# Patient Record
(demographics unavailable — no encounter records)

---

## 2024-10-31 NOTE — PHYSICAL EXAM
[Chaperone Present] : A chaperone was present in the examining room during all aspects of the physical examination [[id number]] : A chaperone was present during the pelvic exam. [FreeTextEntry2] : Inez Villanueva [Appropriately responsive] : appropriately responsive [Alert] : alert [No Acute Distress] : no acute distress [Soft] : soft [Non-tender] : non-tender [Oriented x3] : oriented x3 [FreeTextEntry4] : Clinically well perfused [FreeTextEntry5] : No increased work of breathing  [Labia Majora] : normal [Labia Minora] : normal [Normal] : normal [Uterine Adnexae] : normal

## 2024-10-31 NOTE — PLAN
[FreeTextEntry1] : Pt is a 36 yo G0 with PMH anemia and FH uterine cancer presenting today for consult for AUB-L.   #AUB - Records reviewed on pts phone. Pt to email TVUS and pap records to practice to be scanned to chart.  - CBC today - Given FH endometrial cancer in mother at a young age and current worsening AUB, EMB performed today - Reviewed TVUS with pt and discussed findings in detail. Discussed management options for fibroids including expectant vs medical vs surgical vs. IR. We discussed medical options include oral medications provera and TXA or IUD. We discussed pt is not a good candidate for cOCP given likely cHTN. Discussed hormonal IUD as a good long term option as this would control sx and reduce pts risk of endometrial cancer. Pt is currently trying to conceive and declines IUD at this time. We discussed surgical management with HSC myomectomy, which may be recommended regardless of AUB if pt chooses to pursue fertility treatment. We discussed that I do not recommend IR embolization of uterine artery as she desires future fertility. Pt is most interested in HSC myomectomy.   #Trying to Conceive - Discussed given >36 yo TTC >6 mo would recommend referral to [facility]. Referral provided.   #FH Breast cancer - Pts mother diagnosed with breast cancer in early 40s. Pt has never had mammogram. - Referral provided for mammogram  #Likely HTN - per pt she already has home BP monitor from PCP to confirm diagnosis  Pt likely interested in HSC myomectomy and also desires [facility] consult. We discussed she can pursue HSC myomectomy either with [facility] team or with me. Pt to follow up after [facility] consult regarding next steps. Pt in agreement with plan, all questions answered.

## 2024-10-31 NOTE — PHYSICAL EXAM
[Chaperone Present] : A chaperone was present in the examining room during all aspects of the physical examination [[id number]] : A chaperone was present during the pelvic exam. [FreeTextEntry2] : Inez Villanueva [Appropriately responsive] : appropriately responsive [Alert] : alert [No Acute Distress] : no acute distress [Soft] : soft [Non-tender] : non-tender [Oriented x3] : oriented x3 [FreeTextEntry4] : Clinically well perfused [FreeTextEntry5] : No increased work of breathing  [Labia Majora] : normal [Labia Minora] : normal [Normal] : normal [Uterine Adnexae] : normal

## 2024-10-31 NOTE — HISTORY OF PRESENT ILLNESS
[Patient reported PAP Smear was normal] : Patient reported PAP Smear was normal [Y] : Patient is sexually active [N] : Patient denies prior pregnancies [Currently Active] : currently active [Men] : men [No] : No [FreeTextEntry1] : Pt is a 38 yo G0 presenting for consultation to discuss fibroids. Reports she was seen recently for annual visit with a different provider and had TVUS due to heavy menses and palpated mass on exam. Pt has US report with her today on her phone, reviewed shows 4 1-3 cm fibroids including one with submucosal component. Per US report, benign appearance. She reports pap with other provider wnl. Pt reports she has regular cycles and has very heavy flow requiring pad every 2hrs. Denies intermenstrual spotting. Also reports pain with menses. Reports previously did not have heavy cycles and reports this started ~2 yrs ago. Pt reports her mother had uterine cancer at age 37 and pt is concerned about risk of malignancy.   Of note, pt has h/o chronic anemia. Reports she follows with PCP for this reason and that it has been attributed to bariatric surgery, possibly also from AUB. Reports has not seen heme/onc but has seen GI and had normal endoscopy/colonoscopy. Reports had episode 2 yrs ago of syncope that was attributed to anemia after full neuro work up (including head imaging). Denies current lightheadedness/dizziness.   Pt reports she and her partner have been trying to conceive for last 6 mo and have been having regular unprotected sex.   OBHx: SABx1, TABx1 (D&C) GYNHx: Fibroids, AUB. Denies h/o STI, cyst, abnormal pap. PMH: Anemia as described above, likely HTN (HTN today and per pt at PCP) PSH: Tonsillectomy, LSC gastric sleeve SocHx: Some alcohol use, denies tobacco and drug use FamHx: FH uterine cancer age 37 in mom, breast cancer in mom and maternal aunt in 30s-40s, mom diagnosed early 40s. Thinks mom was tested and BRCA neg. Denies FH colon cancer.  Meds: multivitamin, famotidine, vit D  Allergies: PCN (hives)  [PapSmeardate] : 2024 [LMPDate] : 10/11/24 [MensesFreq] : 28-31 [MensesLength] : 7

## 2024-10-31 NOTE — PROCEDURE
[Endometrial Biopsy] : Endometrial biopsy [Time out performed] : Pre-procedure time out performed.  Patient's name, date of birth and procedure confirmed. [Consent Obtained] : Consent obtained [Pre-op Evaluation] : Pre-op evaluation [Irregular Bleeding] : irregular uterine bleeding [Risks] : risks [Benefits] : benefits [Alternatives] : alternatives [Patient] : patient [Infection] : infection [Bleeding] : bleeding [Allergic Reaction] : allergic reaction [Uterine Perforation] : uterine perforation [Pain] : pain [Negative] : negative pregnancy test [No Premedication] : No premedication [None] : none [Tenaculum] : Tenaculum [Easy Passage] : Easy passage [Sounded to ___ cm] : sounded to [unfilled] ~Ucm [Anteverted] : anteverted [Moderate] : moderate [Specimen Collected] : collected [Sent to Pathology] : placed in buffered formalin and sent for pathology [Tolerated Well] : Patient tolerated the procedure well [No Complications] : No complications [de-identified] : AUB with family history uterine cancer in mother [de-identified] : good hemostasis acheived with application of Monsel's

## 2024-10-31 NOTE — PROCEDURE
[Endometrial Biopsy] : Endometrial biopsy [Time out performed] : Pre-procedure time out performed.  Patient's name, date of birth and procedure confirmed. [Consent Obtained] : Consent obtained [Pre-op Evaluation] : Pre-op evaluation [Irregular Bleeding] : irregular uterine bleeding [Risks] : risks [Benefits] : benefits [Alternatives] : alternatives [Patient] : patient [Infection] : infection [Bleeding] : bleeding [Allergic Reaction] : allergic reaction [Uterine Perforation] : uterine perforation [Pain] : pain [Negative] : negative pregnancy test [No Premedication] : No premedication [None] : none [Tenaculum] : Tenaculum [Easy Passage] : Easy passage [Sounded to ___ cm] : sounded to [unfilled] ~Ucm [Anteverted] : anteverted [Moderate] : moderate [Specimen Collected] : collected [Sent to Pathology] : placed in buffered formalin and sent for pathology [Tolerated Well] : Patient tolerated the procedure well [No Complications] : No complications [de-identified] : AUB with family history uterine cancer in mother [de-identified] : good hemostasis acheived with application of Monsel's

## 2024-11-22 NOTE — PLAN
[FreeTextEntry1] :  Pt is a 38 yo G0 with PMH anemia and likely cHTN and FH uterine cancer following for AUB, desiring surgical management. - Discussed again options for fibroid management including expectant vs medical vs surgical vs IR. Pt elects surgical management.  - Discussed in detail procedure and pre and postoperative expectations for HSC myomectomy. Discussed risks of bleeding, infection, damage to surrounding structures including uterine perforation and risk of need for laparoscopy/laparotomy/other procedure if this were to occur. Also discussed risk of possible need for staged procedure given size of fibroid if fluid deficit met.  - Pt sent US report but images not available. Pt to contact [facility] to obtain disk with images from TVUS.  - SIS in office 1/3, will do preop CBC at that time - Tentative surgery date 1/13 pending PCP clearance and imaging. Pt to see PCP within 30 days of this date for medical clearance.   Pt in agreement with plan, all questions answered. Discussed if continues to have postcoital spotting should RTC sooner.

## 2024-11-22 NOTE — HISTORY OF PRESENT ILLNESS
[FreeTextEntry1] : Telehealth visit:   Pt and I are both in [place]. Pt is a 38 yo G0 with PMH anemia and likely cHTN and FH uterine cancer following for AUB. Had TVUS with [facility] with report showing submucosal fibroid. Pt s/p benign endometrial biopsy and per opt had pap with [facility] that was normal. At last visit, we discussed fibroid management and pt elected HSC myomectomy. Pt presents today to discuss this option further. Is feeling well after EMB. Does endorse postcoital spotting a few days ago (>2 wks after biopsy) which has never happened to her before. Resolved without intervention.

## 2024-12-23 NOTE — HISTORY OF PRESENT ILLNESS
[No Pertinent Cardiac History] : no history of aortic stenosis, atrial fibrillation, coronary artery disease, recent myocardial infarction, or implantable device/pacemaker [No Pertinent Pulmonary History] : no history of asthma, COPD, sleep apnea, or smoking [No Adverse Anesthesia Reaction] : no adverse anesthesia reaction in self or family member [(Patient denies any chest pain, claudication, dyspnea on exertion, orthopnea, palpitations or syncope)] : Patient denies any chest pain, claudication, dyspnea on exertion, orthopnea, palpitations or syncope [Good (7-10 METs)] : Good (7-10 METs) [Chronic Anticoagulation] : no chronic anticoagulation [Chronic Kidney Disease] : no chronic kidney disease [Diabetes] : no diabetes [FreeTextEntry1] : Fibroid Removal  [FreeTextEntry2] : 1/13/2025 [FreeTextEntry3] : Sanjeev  [FreeTextEntry4] : 36 yo f here for preop clearance.  Experiencing heavy menses.   Pending hematology appt on 1/3/2025 at [facility]  Walks a lot daily - denies cp, sob, and palpitations.   Denies any other acute concerns.

## 2024-12-23 NOTE — PHYSICAL EXAM
[Normal Sclera/Conjunctiva] : normal sclera/conjunctiva [EOMI] : extraocular movements intact [Normal Outer Ear/Nose] : the outer ears and nose were normal in appearance [Supple] : supple [Normal Rate] : normal rate  [Regular Rhythm] : with a regular rhythm [Normal S1, S2] : normal S1 and S2 [Soft] : abdomen soft [Non Tender] : non-tender [Non-distended] : non-distended [Normal Bowel Sounds] : normal bowel sounds [Grossly Normal Strength/Tone] : grossly normal strength/tone [Coordination Grossly Intact] : coordination grossly intact [Normal Gait] : normal gait [Normal] : affect was normal and insight and judgment were intact